# Patient Record
Sex: MALE | Race: WHITE | Employment: FULL TIME | URBAN - METROPOLITAN AREA
[De-identification: names, ages, dates, MRNs, and addresses within clinical notes are randomized per-mention and may not be internally consistent; named-entity substitution may affect disease eponyms.]

---

## 2021-10-31 ENCOUNTER — APPOINTMENT (OUTPATIENT)
Dept: GENERAL RADIOLOGY | Age: 61
End: 2021-10-31
Payer: COMMERCIAL

## 2021-10-31 ENCOUNTER — HOSPITAL ENCOUNTER (EMERGENCY)
Age: 61
Discharge: HOME | End: 2021-10-31
Attending: EMERGENCY MEDICINE
Payer: COMMERCIAL

## 2021-10-31 VITALS
RESPIRATION RATE: 16 BRPM | OXYGEN SATURATION: 96 % | HEART RATE: 88 BPM | SYSTOLIC BLOOD PRESSURE: 135 MMHG | BODY MASS INDEX: 28.88 KG/M2 | HEIGHT: 74 IN | WEIGHT: 225 LBS | DIASTOLIC BLOOD PRESSURE: 88 MMHG

## 2021-10-31 DIAGNOSIS — S91.139A PUNCTURE WOUND OF TOE OF LEFT FOOT, INITIAL ENCOUNTER: Primary | ICD-10-CM

## 2021-10-31 PROCEDURE — 6370000000 HC RX 637 (ALT 250 FOR IP): Performed by: EMERGENCY MEDICINE

## 2021-10-31 PROCEDURE — 73630 X-RAY EXAM OF FOOT: CPT

## 2021-10-31 PROCEDURE — 99284 EMERGENCY DEPT VISIT MOD MDM: CPT

## 2021-10-31 RX ORDER — TRAMADOL HYDROCHLORIDE 50 MG/1
TABLET ORAL
COMMUNITY

## 2021-10-31 RX ORDER — FINASTERIDE 5 MG/1
TABLET, FILM COATED ORAL
COMMUNITY

## 2021-10-31 RX ORDER — CIPROFLOXACIN 500 MG/1
500 TABLET, FILM COATED ORAL 2 TIMES DAILY
Qty: 14 TABLET | Refills: 0 | Status: SHIPPED | OUTPATIENT
Start: 2021-10-31 | End: 2021-11-07

## 2021-10-31 RX ORDER — GINSENG 100 MG
CAPSULE ORAL 3 TIMES DAILY
Status: DISCONTINUED | OUTPATIENT
Start: 2021-10-31 | End: 2021-10-31 | Stop reason: HOSPADM

## 2021-10-31 RX ORDER — CYCLOSPORINE 0.5 MG/ML
EMULSION OPHTHALMIC
COMMUNITY

## 2021-10-31 RX ORDER — LANSOPRAZOLE 30 MG/1
CAPSULE, DELAYED RELEASE ORAL
COMMUNITY

## 2021-10-31 RX ORDER — PNV NO.95/FERROUS FUM/FOLIC AC 28MG-0.8MG
TABLET ORAL
COMMUNITY

## 2021-10-31 RX ORDER — ATORVASTATIN CALCIUM 10 MG/1
TABLET, FILM COATED ORAL
COMMUNITY
Start: 2020-04-30

## 2021-10-31 RX ORDER — CIPROFLOXACIN 250 MG/1
500 TABLET, FILM COATED ORAL ONCE
Status: DISCONTINUED | OUTPATIENT
Start: 2021-10-31 | End: 2021-10-31

## 2021-10-31 RX ORDER — CIPROFLOXACIN 250 MG/1
500 TABLET, FILM COATED ORAL ONCE
Status: COMPLETED | OUTPATIENT
Start: 2021-10-31 | End: 2021-10-31

## 2021-10-31 RX ORDER — TAMSULOSIN HYDROCHLORIDE 0.4 MG/1
CAPSULE ORAL
COMMUNITY
Start: 2020-04-30

## 2021-10-31 RX ADMIN — BACITRACIN: 500 OINTMENT TOPICAL at 13:50

## 2021-10-31 RX ADMIN — CIPROFLOXACIN 500 MG: 250 TABLET, FILM COATED ORAL at 13:24

## 2021-10-31 NOTE — ED PROVIDER NOTES
[id number] [facility] ED  [address] [address] [facility] [address] [facility] [address]  Phone: [phone]  Fax: [phone]        Pt Name: Leonor Mcmullen  MRN: [id number]  [place] 1960  Date of evaluation: 10/31/21      CHIEF COMPLAINT     Chief Complaint   Patient presents with    Toe Pain     left great toe puncture wound from a pitch fork (through boot) and bleeding controlled. HISTORY OF PRESENT ILLNESS  (Location/Symptom, Timing/Onset, Context/Setting, Quality, Duration, Modifying Factors, Severity.)    Leonor Mcmullen is a 62 y.o. male who presents with a left great toe injury. The patient was using a pitchfork when he accidentally punctured his boot and his left great toe with a pitchfork just prior to arrival bleeding is well controlled tetanus is up-to-date states he has no discomfort with toe he does have a little numbness sensation nothing seems to make his symptoms better or worse      REVIEW OF SYSTEMS    (2-9 systems for level 4, 10 or more for level 5)     Review of Systems   Musculoskeletal:        Left great toe injury   Skin: Positive for wound. Neurological: Negative for weakness. PAST MEDICAL HISTORY    has a past medical history of Chronic back pain, GERD (gastroesophageal reflux disease), and Hyperlipidemia. SURGICAL HISTORY      has a past surgical history that includes back surgery; hernia repair; and Nose surgery.     CURRENTMEDICATIONS       Previous Medications    ATORVASTATIN (LIPITOR) 10 MG TABLET    atorvastatin 10 mg tablet    CYCLOSPORINE (RESTASIS) 0.05 % OPHTHALMIC EMULSION    Restasis 0.05 % eye drops in a dropperette   INSTILL 1 DROP IN [facility] EYE TWO TIMES A DAY    FERROUS SULFATE (IRON) 325 (65 FE) MG TABS    iron   1 daily    FINASTERIDE (PROSCAR) 5 MG TABLET    finasteride 5 mg tablet    LANSOPRAZOLE (PREVACID) 30 MG DELAYED RELEASE CAPSULE    lansoprazole 30 mg capsule,delayed release    MULTIPLE VITAMINS-MINERALS (DAILY MULTI VITAMIN/MINERALS PO) Daily Multi-Vitamin   1 daily    TAMSULOSIN (FLOMAX) 0.4 MG CAPSULE    tamsulosin 0.4 mg capsule    TRAMADOL (ULTRAM) 50 MG TABLET    tramadol 50 mg tablet   TAKE 1 TABLET BY MOUTH TWICE A DAY AS NEEDED       ALLERGIES     has No Known Allergies. FAMILY HISTORY     has no family status information on file. family history is not on file. SOCIAL HISTORY      reports that he has never smoked. He has never used smokeless tobacco. He reports current alcohol use. He reports that he does not use drugs. PHYSICAL EXAM    (up to 7 for level 4, 8 or more for level 5)   INITIAL VITALS:  height is 6' 2\" (1.88 m) and weight is 102.1 kg (225 lb). His blood pressure is 135/88 and his pulse is 88. His respiration is 16 and oxygen saturation is 96%. Physical Exam  Vitals and nursing note reviewed. Constitutional:       Appearance: Normal appearance. HENT:      Head: Normocephalic and atraumatic. Eyes:      Conjunctiva/sclera: Conjunctivae normal.   Musculoskeletal:         General: Normal range of motion. Cervical back: Normal range of motion and neck supple. Comments: There is a puncture wound to the left great toe to the top and bottom of the toe where the pitchfork had went completely through the toe it is to the lateral aspect of the great toe has good pulses motor sensation of all digits of the left lower extremity   Skin:     Capillary Refill: Capillary refill takes less than 2 seconds. Comments: Puncture wound to the top of bottom of the left great toe otherwise without further rashes or lesions   Neurological:      General: No focal deficit present. Mental Status: He is alert.          DIFFERENTIAL DIAGNOSIS/ MDM:     We will give the patient some antibiotics and get an x-ray of the left foot    DIAGNOSTIC RESULTS         RADIOLOGY:        Interpretation per the Radiologist below, if available at the time of this note:    XR FOOT LEFT (MIN 3 VIEWS) (Final result)  Result time 10/31/21 13:34:11  Final result by Carl Restrepo MD (10/31/21 13:34:11)                Impression:    Soft tissue swelling and arthritic change most significant 1st   metatarsal-phalangeal joint without acute fracture or radiopaque foreign body. Narrative:    EXAMINATION:   THREE XRAY VIEWS OF THE LEFT FOOT     10/31/2021 12:54 pm     COMPARISON:   None. HISTORY:   ORDERING SYSTEM PROVIDED HISTORY: great toe injury   TECHNOLOGIST PROVIDED HISTORY:   great toe injury   Reason for Exam: left great toe pain   Acuity: Acute   Type of Exam: Initial   Mechanism of Injury: puncture by pitchfork     FINDINGS:   Advanced 1st metatarsal-phalangeal arthritic changes are noted with marginal   spurring and a tailor's bunion formation.  Soft tissue swelling of the great   toe is noted without acute fracture or dislocation.  No radiopaque foreign   body. LABS:  No results found for this visit on 10/31/21. EMERGENCY DEPARTMENT COURSE:   Vitals:    Vitals:    10/31/21 1247   BP: 135/88   Pulse: 88   Resp: 16   SpO2: 96%   Weight: 102.1 kg (225 lb)   Height: 6' 2\" (1.88 m)     -------------------------  BP: 135/88,  , Pulse: 88, Resp: 16      RE-EVALUATION:  Imaging shows no foreign body no fracture the patient presents with a puncture wound from a pitchfork I will place him on Cipro recommending that he keep the area clean and dry may apply antibiotic ointment return to the ER for any signs of infection such as redness swelling fever drainage or other concerns otherwise to get a wound check by his family doctor within the next few days  At this time the patient is without objective evidence of an acute process requiring hospitalization or inpatient management. They have remained hemodynamically stable throughout their entire ED visit and are stable for discharge with outpatient follow-up.      The patient understands that at this time there is no evidence for a more malignant underlying

## 2023-03-12 ENCOUNTER — HOSPITAL ENCOUNTER (OUTPATIENT)
Age: 63
Discharge: HOME | End: 2023-03-12
Payer: COMMERCIAL

## 2023-03-12 LAB
CHOLEST SERPL-MCNC: 178 MG/DL
CHOLESTEROL/HDL RATIO: 2.9
HDLC SERPL-MCNC: 62 MG/DL
LDLC SERPL CALC-MCNC: 104 MG/DL (ref 0–130)
T4 FREE SERPL-MCNC: 1.05 NG/DL (ref 0.93–1.7)
TRIGL SERPL-MCNC: 61 MG/DL
TSH SERPL-ACNC: 6.28 UIU/ML (ref 0.3–5)

## 2023-03-12 PROCEDURE — 84439 ASSAY OF FREE THYROXINE: CPT

## 2023-03-12 PROCEDURE — 84154 ASSAY OF PSA FREE: CPT

## 2023-03-12 PROCEDURE — 84443 ASSAY THYROID STIM HORMONE: CPT

## 2023-03-12 PROCEDURE — 36415 COLL VENOUS BLD VENIPUNCTURE: CPT

## 2023-03-12 PROCEDURE — 80061 LIPID PANEL: CPT

## 2023-03-13 LAB
PSA FREE MFR SERPL: 25 %
PSA FREE SERPL-MCNC: 0.2 UG/L
PSA SERPL-MCNC: 0.8 UG/L (ref 0–4)